# Patient Record
Sex: FEMALE | Race: WHITE | NOT HISPANIC OR LATINO | URBAN - METROPOLITAN AREA
[De-identification: names, ages, dates, MRNs, and addresses within clinical notes are randomized per-mention and may not be internally consistent; named-entity substitution may affect disease eponyms.]

---

## 2018-09-10 ENCOUNTER — OFFICE VISIT (OUTPATIENT)
Dept: URBAN - METROPOLITAN AREA CLINIC 193 | Facility: CLINIC | Age: 28
End: 2018-09-10
Payer: COMMERCIAL

## 2018-09-10 PROCEDURE — 92012 INTRM OPH EXAM EST PATIENT: CPT | Performed by: OPTOMETRIST

## 2018-09-10 RX ORDER — NEOMYCIN SULFATE, POLYMYXIN B SULFATE AND DEXAMETHASONE 3.5; 10000; 1 MG/ML; [USP'U]/ML; MG/ML
SUSPENSION OPHTHALMIC
Qty: 5 | Refills: 0 | Status: INACTIVE
Start: 2018-09-10 | End: 2018-09-16

## 2018-09-10 ASSESSMENT — INTRAOCULAR PRESSURE
OD: 8
OS: 8

## 2018-09-10 NOTE — IMPRESSION/PLAN
Impression: Other chronic allergic conjunctivitis: H10.45. Plan: Okay to resume ketotifen BID OU for chronic use. Anything will burn if eye already irritated.

## 2018-09-10 NOTE — IMPRESSION/PLAN
Impression: Other corneal scars and opacities: H17.89. s/p LASIK OU. Flaps are flat/in position OU.  Plan: observe

## 2018-09-10 NOTE — IMPRESSION/PLAN
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 Cat licks face. Discharge OD only. Much rubbing. Plan: Discussed. Advise against cat touching facial area. Begin Maxitrol QID OU 2 weeks. Glaucoma precautions given. Call if worsens at all.

## 2018-09-14 ENCOUNTER — OFFICE VISIT (OUTPATIENT)
Dept: URBAN - METROPOLITAN AREA CLINIC 193 | Facility: CLINIC | Age: 28
End: 2018-09-14
Payer: COMMERCIAL

## 2018-09-14 DIAGNOSIS — H10.45 OTHER CHRONIC ALLERGIC CONJUNCTIVITIS: ICD-10-CM

## 2018-09-14 DIAGNOSIS — H16.143 PUNCTATE KERATITIS, BILATERAL: Primary | ICD-10-CM

## 2018-09-14 DIAGNOSIS — H17.89 OTHER CORNEAL SCARS AND OPACITIES: ICD-10-CM

## 2018-09-14 PROCEDURE — 99213 OFFICE O/P EST LOW 20 MIN: CPT | Performed by: OPTOMETRIST

## 2018-09-14 ASSESSMENT — INTRAOCULAR PRESSURE
OS: 12
OD: 11

## 2018-09-14 NOTE — IMPRESSION/PLAN
Impression: Other chronic allergic conjunctivitis: H10.45. Plan: Okay to resume ketotifen BID OU for chronic use. Try [place]. Sample given. Anything will burn if eye already irritated.

## 2018-09-14 NOTE — IMPRESSION/PLAN
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 Cat licks face. SPK less dense. More diffuse. No more discharge. Mild itch. Plan: Discussed. Advise against cat touching facial area. Continue Maxitrol QID OU for rest of week. Glaucoma precautions given. Call if worsens at all.

## 2018-09-25 ENCOUNTER — OFFICE VISIT (OUTPATIENT)
Dept: URBAN - METROPOLITAN AREA CLINIC 193 | Facility: CLINIC | Age: 28
End: 2018-09-25
Payer: COMMERCIAL

## 2018-09-25 PROCEDURE — 99213 OFFICE O/P EST LOW 20 MIN: CPT | Performed by: OPTOMETRIST

## 2018-09-25 NOTE — IMPRESSION/PLAN
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 SPK more dense this time. Not improving with any drops. Suspect sensitive to something even in preservative free drops. Plan: Discussed. Stop using any drops. Close eyes as much as possible. Gave option to see ophthalmologist Miguel Tay) tomorrow (Wednesday). Pt declines for now. No NaFl drops on day.